# Patient Record
Sex: MALE | URBAN - METROPOLITAN AREA
[De-identification: names, ages, dates, MRNs, and addresses within clinical notes are randomized per-mention and may not be internally consistent; named-entity substitution may affect disease eponyms.]

---

## 2021-07-21 ENCOUNTER — OFFICE VISIT (OUTPATIENT)
Dept: URBAN - METROPOLITAN AREA CLINIC 10 | Facility: CLINIC | Age: 51
End: 2021-07-21
Payer: COMMERCIAL

## 2021-07-21 DIAGNOSIS — H02.112 CICATRICIAL ECTROPION OF RIGHT LOWER EYELID: ICD-10-CM

## 2021-07-21 DIAGNOSIS — H02.115 CICATRICIAL ECTROPION OF LEFT LOWER EYELID: Primary | ICD-10-CM

## 2021-07-21 PROCEDURE — 92285 EXTERNAL OCULAR PHOTOGRAPHY: CPT | Performed by: OPHTHALMOLOGY

## 2021-07-21 PROCEDURE — 99204 OFFICE O/P NEW MOD 45 MIN: CPT | Performed by: OPHTHALMOLOGY

## 2021-07-21 RX ORDER — ERYTHROMYCIN 5 MG/G
OINTMENT OPHTHALMIC
Qty: 1 | Refills: 1 | Status: INACTIVE
Start: 2021-07-21 | End: 2021-08-23

## 2021-07-21 NOTE — IMPRESSION/PLAN
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 Plan: The patient demonstrates tarsal ectropion along with midface descent and negative malar vector. This pathology is causing exposure keratoconjunctivitis, FB sensation, tearing, and ocular irritation. To resolve this, I recommended lateral canthal resuspension, FTSG, ATR<10cm, temporary Tarsorraphy eyelid to achieve myocutaneous elevation of the midface orbicularis/SOOF to recruit anterior lamella, support the lower eyelid position, and prevent hammocking of the lid beneath the globe given negative malar vector, and ectropion repair w/ suture to replace the tarsus in physiologic position, and protect the globe. Risks, benefits, and alternatives (including no surgery) discussed with patient.

## 2021-07-21 NOTE — IMPRESSION/PLAN
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 Plan: The patient demonstrates tarsal ectropion along with midface descent and negative malar vector. This pathology is causing exposure keratoconjunctivitis, FB sensation, tearing, and ocular irritation. To resolve this, I recommended lateral canthal resuspension, FTSG, ATR<10cm, temporary Tarsorraphy eyelid to achieve myocutaneous elevation of the midface orbicularis/SOOF to recruit anterior lamella, support the lower eyelid position, and prevent hammocking of the lid beneath the globe given negative malar vector, and ectropion repair w/ suture to replace the tarsus in physiologic position, and protect the globe. Risks, benefits, and alternatives (including no surgery) discussed with patient.  

LEFT EYE FIRST, WILL OPERATE ON RIGHT EYE 1 MONTH AFTER

## 2021-08-12 ENCOUNTER — PRE-OPERATIVE VISIT (OUTPATIENT)
Dept: URBAN - METROPOLITAN AREA CLINIC 24 | Facility: CLINIC | Age: 51
End: 2021-08-12
Payer: COMMERCIAL

## 2021-08-12 PROCEDURE — 99213 OFFICE O/P EST LOW 20 MIN: CPT | Performed by: OPHTHALMOLOGY

## 2021-08-12 NOTE — IMPRESSION/PLAN
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 Plan: Patients additional questions answered in clinic today. Keep surgical plan as scheduled.

## 2021-08-13 ENCOUNTER — ADULT PHYSICAL (OUTPATIENT)
Dept: URBAN - METROPOLITAN AREA CLINIC 10 | Facility: CLINIC | Age: 51
End: 2021-08-13
Payer: COMMERCIAL

## 2021-08-13 PROCEDURE — 99202 OFFICE O/P NEW SF 15 MIN: CPT | Performed by: PHYSICIAN ASSISTANT

## 2021-08-19 ENCOUNTER — SURGERY (OUTPATIENT)
Dept: URBAN - METROPOLITAN AREA SURGERY 12 | Facility: SURGERY | Age: 51
End: 2021-08-19
Payer: COMMERCIAL

## 2021-08-19 PROCEDURE — 15260 FTH/GFT FR N/E/E/L 20 SQCM/<: CPT | Performed by: OPHTHALMOLOGY

## 2021-08-19 RX ORDER — HYDROCODONE BITARTRATE AND ACETAMINOPHEN 5; 325 MG/1; MG/1
TABLET ORAL
Qty: 20 | Refills: 0 | Status: INACTIVE
Start: 2021-08-19 | End: 2021-10-27

## 2021-08-23 ENCOUNTER — POST-OPERATIVE VISIT (OUTPATIENT)
Dept: URBAN - METROPOLITAN AREA CLINIC 37 | Facility: CLINIC | Age: 51
End: 2021-08-23

## 2021-08-23 RX ORDER — ERYTHROMYCIN 5 MG/G
OINTMENT OPHTHALMIC
Qty: 1 | Refills: 1 | Status: ACTIVE
Start: 2021-08-23

## 2021-08-23 NOTE — IMPRESSION/PLAN
Impression: Encounter for other preprocedural examination: Z01.758. Plan: removed and replaced pressure patch OS. tissue looks pink and healthy. added maxitrol carl applied under new patch. 
asked patient to follow up w Dr Teddy Veras in 1 week

## 2021-08-25 ENCOUNTER — OFFICE VISIT (OUTPATIENT)
Dept: URBAN - METROPOLITAN AREA CLINIC 10 | Facility: CLINIC | Age: 51
End: 2021-08-25
Payer: COMMERCIAL

## 2021-08-25 PROCEDURE — 99214 OFFICE O/P EST MOD 30 MIN: CPT | Performed by: OPHTHALMOLOGY

## 2021-08-25 PROCEDURE — 92285 EXTERNAL OCULAR PHOTOGRAPHY: CPT | Performed by: OPHTHALMOLOGY

## 2021-08-25 RX ORDER — PREDNISOLONE ACETATE 10 MG/ML
1 % SUSPENSION/ DROPS OPHTHALMIC
Qty: 1 | Refills: 2 | Status: ACTIVE
Start: 2021-08-25

## 2021-08-25 NOTE — IMPRESSION/PLAN
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 Plan: The patient demonstrates tarsal ectropion along with midface descent and negative malar vector. This pathology is causing exposure keratoconjunctivitis, FB sensation, tearing, and ocular irritation. To resolve this, I recommended lateral canthal resuspension, FTSG, ATR<10cm, temporary Tarsorraphy eyelid to achieve myocutaneous elevation of the midface orbicularis/SOOF to recruit anterior lamella, support the lower eyelid position, and prevent hammocking of the lid beneath the globe given negative malar vector, and ectropion repair w/ suture to replace the tarsus in physiologic position, and protect the globe. Risks, benefits, and alternatives (including no surgery) discussed with patient.

## 2021-08-25 NOTE — IMPRESSION/PLAN
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 Plan: lid in good position, graft healing well. schedule RLL surgery in 3-4 weeks.

## 2021-09-24 ENCOUNTER — ADULT PHYSICAL (OUTPATIENT)
Dept: URBAN - METROPOLITAN AREA CLINIC 10 | Facility: CLINIC | Age: 51
End: 2021-09-24
Payer: COMMERCIAL

## 2021-09-24 DIAGNOSIS — Z01.818 ENCOUNTER FOR OTHER PREPROCEDURAL EXAMINATION: Primary | ICD-10-CM

## 2021-09-24 PROCEDURE — 99213 OFFICE O/P EST LOW 20 MIN: CPT | Performed by: PHYSICIAN ASSISTANT

## 2021-10-20 ENCOUNTER — SURGERY (OUTPATIENT)
Dept: URBAN - METROPOLITAN AREA SURGERY 5 | Facility: SURGERY | Age: 51
End: 2021-10-20
Payer: COMMERCIAL

## 2021-10-20 PROCEDURE — 15260 FTH/GFT FR N/E/E/L 20 SQCM/<: CPT | Performed by: OPHTHALMOLOGY

## 2021-10-20 RX ORDER — HYDROCODONE BITARTRATE AND ACETAMINOPHEN 5; 325 MG/1; MG/1
TABLET ORAL
Qty: 20 | Refills: 0 | Status: ACTIVE
Start: 2021-10-20

## 2021-10-27 ENCOUNTER — POST-OPERATIVE VISIT (OUTPATIENT)
Dept: URBAN - METROPOLITAN AREA CLINIC 10 | Facility: CLINIC | Age: 51
End: 2021-10-27
Payer: COMMERCIAL

## 2021-10-27 PROCEDURE — 99024 POSTOP FOLLOW-UP VISIT: CPT | Performed by: OPHTHALMOLOGY

## 2021-10-27 NOTE — IMPRESSION/PLAN
Impression:  Encounter for other specified surgical aftercare  Z48.89. Plan: sutures removed today. 5fu/kenalog injected LLL.  f/u 1 mo

## 2021-11-10 ENCOUNTER — POST-OPERATIVE VISIT (OUTPATIENT)
Dept: URBAN - METROPOLITAN AREA CLINIC 10 | Facility: CLINIC | Age: 51
End: 2021-11-10
Payer: COMMERCIAL

## 2021-11-10 DIAGNOSIS — Z48.89 ENCOUNTER FOR OTHER SPECIFIED SURGICAL AFTERCARE: Primary | ICD-10-CM

## 2021-11-10 PROCEDURE — 99024 POSTOP FOLLOW-UP VISIT: CPT | Performed by: OPHTHALMOLOGY

## 2021-11-10 NOTE — IMPRESSION/PLAN
Impression:  Encounter for other specified surgical aftercare  Z48.89.  Plan: kenalog injected BLL, f/u 2 months

## 2022-01-13 ENCOUNTER — OFFICE VISIT (OUTPATIENT)
Dept: URBAN - METROPOLITAN AREA CLINIC 10 | Facility: CLINIC | Age: 52
End: 2022-01-13
Payer: COMMERCIAL

## 2022-01-13 PROCEDURE — 99024 POSTOP FOLLOW-UP VISIT: CPT | Performed by: OPHTHALMOLOGY

## 2022-01-13 NOTE — IMPRESSION/PLAN
Impression: Encounter for other specified surgical aftercare: Z48.89. Plan: Discussed LL concerns with patient. Explained ideally, we should wait a year before proceeding with any LL correction surgery vs filler, due to history of LL ectropion. If the eyelids appear stable in a year, we can discuss options. Patient expressed understanding and will call back when/if he is ready to schedule.

## 2024-05-24 ENCOUNTER — OFFICE VISIT (OUTPATIENT)
Dept: URBAN - METROPOLITAN AREA CLINIC 33 | Facility: CLINIC | Age: 54
End: 2024-05-24
Payer: COMMERCIAL

## 2024-05-24 DIAGNOSIS — H10.45 OTHER CHRONIC ALLERGIC CONJUNCTIVITIS: ICD-10-CM

## 2024-05-24 DIAGNOSIS — H16.223 KERATOCONJUNCTIVITIS SICCA, BILATERAL: Primary | ICD-10-CM

## 2024-05-24 PROCEDURE — 99203 OFFICE O/P NEW LOW 30 MIN: CPT

## 2024-05-24 RX ORDER — PREDNISOLONE ACETATE 10 MG/ML
1 % SUSPENSION/ DROPS OPHTHALMIC
Qty: 5 | Refills: 1 | Status: ACTIVE
Start: 2024-05-24

## 2024-05-24 ASSESSMENT — INTRAOCULAR PRESSURE
OS: 11
OD: 11